# Patient Record
Sex: MALE
[De-identification: names, ages, dates, MRNs, and addresses within clinical notes are randomized per-mention and may not be internally consistent; named-entity substitution may affect disease eponyms.]

---

## 2023-07-03 PROBLEM — Z00.00 ENCOUNTER FOR PREVENTIVE HEALTH EXAMINATION: Status: ACTIVE | Noted: 2023-07-03

## 2023-07-11 ENCOUNTER — APPOINTMENT (OUTPATIENT)
Dept: SURGERY | Facility: CLINIC | Age: 58
End: 2023-07-11
Payer: COMMERCIAL

## 2023-07-11 VITALS
WEIGHT: 142 LBS | OXYGEN SATURATION: 96 % | SYSTOLIC BLOOD PRESSURE: 142 MMHG | HEIGHT: 68 IN | DIASTOLIC BLOOD PRESSURE: 96 MMHG | HEART RATE: 90 BPM | TEMPERATURE: 98.4 F | BODY MASS INDEX: 21.52 KG/M2

## 2023-07-11 DIAGNOSIS — K83.8 OTHER SPECIFIED DISEASES OF BILIARY TRACT: ICD-10-CM

## 2023-07-11 PROCEDURE — 99203 OFFICE O/P NEW LOW 30 MIN: CPT

## 2023-07-11 NOTE — PLAN
[FreeTextEntry1] : \par - I spoke at length with the patient regarding the findings, which at this time the patient has no symptoms nor history that explain the finding\par - Raise the possibility that the findings are an over read, however will first obtain CT abdomen/pelvis to visualize the abdomen and rule out other etiologies\par - Patient to follow with me after imaging performed\par - Patient was agreeable to this plan and all his questions were answered to his apparent satisfaction

## 2023-07-11 NOTE — HISTORY OF PRESENT ILLNESS
[de-identified] : [place] translation via Vijay Reyes\sonja Patient referred by Dr. Paula Reyes\par \par Patient is a 58 year old male with history of HLD, PSHx of appendectomy, who presents after being found to have pneumobilia on abdominal US performed on 6/25/2023. As per patient, he regularly drinks Soju and was therefore being assessed by his PCP for his liver, for which the abdominal US was ordered. Due to the incidentally found pneumobilia read on the US he was referred to me. However patient denies any symptoms at this time, no pain, is tolerating diet well, having normal bowel function. States that he has recently undergone colonoscopy which was negative for any findings. Denies smoking, drinks primarily Soju daily, no drug use. NKDA.

## 2023-07-11 NOTE — ASSESSMENT
[FreeTextEntry1] : 58 year old male in good health found to have incidental pneumobilia on abdominal US

## 2023-07-11 NOTE — PHYSICAL EXAM
[Respiratory Effort] : normal respiratory effort [Normal Rate and Rhythm] : normal rate and rhythm [Alert] : alert [Oriented to Person] : oriented to person [Oriented to Place] : oriented to place [Oriented to Time] : oriented to time [Calm] : calm [de-identified] : NAD [de-identified] : No jaundice [de-identified] : Soft, non-distended, non-TTP in all quadrants, no rebound/guarding, no palpable masses [de-identified] : No skin changes to abdominal wall

## 2023-07-12 ENCOUNTER — RESULT REVIEW (OUTPATIENT)
Age: 58
End: 2023-07-12

## 2023-07-24 ENCOUNTER — OUTPATIENT (OUTPATIENT)
Dept: OUTPATIENT SERVICES | Facility: HOSPITAL | Age: 58
LOS: 1 days | End: 2023-07-24
Payer: COMMERCIAL

## 2023-07-24 ENCOUNTER — APPOINTMENT (OUTPATIENT)
Dept: CT IMAGING | Facility: IMAGING CENTER | Age: 58
End: 2023-07-24
Payer: COMMERCIAL

## 2023-07-24 DIAGNOSIS — K83.8 OTHER SPECIFIED DISEASES OF BILIARY TRACT: ICD-10-CM

## 2023-07-24 PROCEDURE — 74177 CT ABD & PELVIS W/CONTRAST: CPT

## 2023-07-24 PROCEDURE — 74177 CT ABD & PELVIS W/CONTRAST: CPT | Mod: 26
